# Patient Record
Sex: FEMALE | Race: WHITE | NOT HISPANIC OR LATINO | Employment: OTHER | URBAN - METROPOLITAN AREA
[De-identification: names, ages, dates, MRNs, and addresses within clinical notes are randomized per-mention and may not be internally consistent; named-entity substitution may affect disease eponyms.]

---

## 2022-10-26 NOTE — PROGRESS NOTES
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 Diagnoses of Gastroesophageal reflux disease, unspecified whether esophagitis present, History of colon polyps, and Screening for colon cancer were also pertinent to this visit.    PCP: Tuan Bradford       HPI:  This is a 71 y.o. female who has seen Dr. Duvall in the past. Patient with h/o Ji's esophagus, Schatzki's ring, and colon polyps. Patient reports mid-chest dysphagia with solids and believes she needs another dilation. She also reports intermittent reflux, takes Mylanta as needed. Not on daily PPI. She reports since having Covid in 8/2022, she has had chronic cough and within the past couple weeks she has noticed a change in her voice that she believes is due from cough.       EGD 7/18/2018: Schatzki's ring s/p dilation with 48 FR. Bx's show JI'S-no dysplasia-repeat EGD in 3 years to monitor it. F/U prn dysphagia. Rec Prilosec qD given the Ji's.    EGD/Colonoscopy 12/4/2017:  Changes consistent with Ji's esophagus-continue OTC PPI qD. Repeat colon in 5 yr    Review of Systems   Constitutional:  Negative for chills, diaphoresis, fatigue, fever and unexpected weight change.   HENT:  Positive for postnasal drip and voice change. Negative for mouth sores, nosebleeds, sore throat and trouble swallowing.    Eyes:  Positive for eye dryness. Negative for pain and discharge.   Respiratory:  Positive for apnea and cough. Negative for choking, chest tightness, shortness of breath and wheezing.    Cardiovascular:  Negative for chest pain, palpitations, leg swelling and claudication.   Gastrointestinal:  Positive for reflux. Negative for abdominal distention, abdominal pain, anal bleeding, blood in stool, change in bowel habit, constipation, diarrhea, nausea, rectal pain, vomiting, fecal incontinence and change in bowel habit.   Genitourinary:  Negative for bladder incontinence, difficulty  urinating, dysuria, flank pain, frequency and hematuria.   Musculoskeletal:  Negative for arthralgias, back pain, joint swelling and joint deformity.   Integumentary:  Negative for color change, rash and wound.   Allergic/Immunologic: Negative for environmental allergies and food allergies.   Neurological:  Negative for seizures, facial asymmetry, speech difficulty, weakness, headaches and memory loss.   Hematological:  Negative for adenopathy. Does not bruise/bleed easily.   Psychiatric/Behavioral:  Negative for agitation, behavioral problems, confusion, hallucinations and sleep disturbance.       Past Medical History:   Diagnosis Date    Allergies     Colon polyp     Dry eyes     GERD (gastroesophageal reflux disease)     High cholesterol     HTN (hypertension)      Past Surgical History:   Procedure Laterality Date    BREAST LUMPECTOMY Left      SECTION      COLONOSCOPY      UPPER GASTROINTESTINAL ENDOSCOPY       Family History   Problem Relation Age of Onset    Heart disease Mother     Kidney failure Mother     COPD Mother     Colon cancer Neg Hx     Ulcerative colitis Neg Hx     Pancreatic cancer Neg Hx     Esophageal cancer Neg Hx     Throat cancer Neg Hx     Stomach cancer Neg Hx     Liver disease Neg Hx      Social History     Tobacco Use    Smoking status: Never     Passive exposure: Never    Smokeless tobacco: Never   Substance Use Topics    Alcohol use: Never    Drug use: Never     Review of patient's allergies indicates:  No Known Allergies     Medication List with Changes/Refills   New Medications    SODIUM,POTASSIUM,MAG SULFATES (SUPREP BOWEL PREP KIT) 17.5-3.13-1.6 GRAM SOLR    Take 177 mLs by mouth once. Take according to kit instructions but DO NOT eat breakfast the morning of procedure. for 1 dose   Current Medications    ASPIRIN (ECOTRIN) 81 MG EC TABLET    Take 81 mg by mouth once daily.    CARBOXYMETHYLCELLULOSE SODIUM 0.25 % DPET    1 drop as needed.    CETIRIZINE (ZYRTEC) 10 MG TABLET   "  Take 10 mg by mouth once daily.    CO-ENZYME Q-10 30 MG CAPSULE    Take 30 mg by mouth 3 (three) times daily.    LOSARTAN (COZAAR) 50 MG TABLET    Take 50 mg by mouth 2 (two) times daily.    PRAVASTATIN (PRAVACHOL) 40 MG TABLET    Take 40 mg by mouth every evening.    VITAMIN D (VITAMIN D3) 1000 UNITS TAB    Take 1,000 Units by mouth once daily.    ZOLPIDEM (AMBIEN) 10 MG TAB    Take 5 mg by mouth nightly as needed.        Vital Signs:  BP (!) 148/81   Pulse 63   Temp 98.1 °F (36.7 °C)   Ht 5' 1" (1.549 m)   Wt 92.2 kg (203 lb 3.2 oz)   SpO2 (!) 92%   BMI 38.39 kg/m²        Physical Exam  Vitals reviewed.   Constitutional:       General: She is awake. She is not in acute distress.     Appearance: Normal appearance. She is well-developed. She is not ill-appearing, toxic-appearing or diaphoretic.   HENT:      Head: Normocephalic and atraumatic.      Nose: Nose normal.      Mouth/Throat:      Mouth: Mucous membranes are moist.      Pharynx: Oropharynx is clear. No oropharyngeal exudate or posterior oropharyngeal erythema.   Eyes:      General: Lids are normal. Gaze aligned appropriately. No scleral icterus.        Right eye: No discharge.         Left eye: No discharge.      Extraocular Movements: Extraocular movements intact.      Conjunctiva/sclera: Conjunctivae normal.   Neck:      Trachea: Trachea normal.   Cardiovascular:      Rate and Rhythm: Normal rate and regular rhythm.      Pulses:           Radial pulses are 2+ on the right side and 2+ on the left side.   Pulmonary:      Effort: Pulmonary effort is normal. No respiratory distress.      Breath sounds: Normal breath sounds. No stridor. No wheezing or rhonchi.   Chest:      Chest wall: No tenderness.   Abdominal:      General: Bowel sounds are normal. There is no distension.      Palpations: Abdomen is soft. There is no fluid wave, hepatomegaly or mass.      Tenderness: There is no abdominal tenderness. There is no guarding or rebound. "   Musculoskeletal:         General: No tenderness or deformity.      Cervical back: Full passive range of motion without pain and neck supple. No tenderness.      Right lower leg: No edema.      Left lower leg: No edema.   Lymphadenopathy:      Cervical: No cervical adenopathy.   Skin:     General: Skin is warm and dry.      Capillary Refill: Capillary refill takes less than 2 seconds.      Coloration: Skin is not cyanotic, jaundiced or pale.      Findings: No rash.   Neurological:      General: No focal deficit present.      Mental Status: She is alert and oriented to person, place, and time.      Cranial Nerves: No facial asymmetry.      Motor: No tremor.   Psychiatric:         Attention and Perception: Attention normal.         Mood and Affect: Mood and affect normal.         Speech: Speech normal.         Behavior: Behavior normal. Behavior is cooperative.          All of the data above and below has been reviewed by myself and any further interpretations will be reflected in the assessment and plan.   The data includes review of external notes, and independent interpretation of lab results, procedures, x-rays, and imaging reports.      Assessment:  Veliz's esophagus without dysplasia  Comments:  EGD 7/2018 with Veliz's. Would recommend the patient be on PPI daily. She will take Prilosec OTC  Orders:  -     Ambulatory Referral to External Surgery    Gastroesophageal reflux disease, unspecified whether esophagitis present  -     Ambulatory Referral to External Surgery    History of colon polyps  -     Ambulatory Referral to External Surgery    Screening for colon cancer  -     sodium,potassium,mag sulfates (SUPREP BOWEL PREP KIT) 17.5-3.13-1.6 gram SolR; Take 177 mLs by mouth once. Take according to kit instructions but DO NOT eat breakfast the morning of procedure. for 1 dose  Dispense: 1 kit; Refill: 0         Recommendations:  Schedule upper and [place]oscop[place] [facility] [place]  12/4/2022  Begin taking Prilosec OTC daily.     Risks, benefits, and alternatives of medical management, any associated procedures, and/or treatment discussed with the patient. Patient given opportunity to ask questions and voices understanding. Patient has elected to proceed with the recommended care modalities as discussed.    Follow up if symptoms worsen or fail to improve.    Order summary:  Orders Placed This Encounter   Procedures    Ambulatory Referral to External Surgery          Instructed patient to notify my office if they have not been contacted within two weeks after any procedures, submitting any samples (biopsies, blood, stool, urine, etc.) or after any imaging (X-ray, CT, MRI, etc.).      Ernestina Lazo NP    This document may have been created using a voice recognition transcribing system. Incorrect words or phrases may have been missed during proofreading. Please interpret accordingly or contact me for clarification.

## 2022-10-27 ENCOUNTER — OFFICE VISIT (OUTPATIENT)
Dept: GASTROENTEROLOGY | Facility: CLINIC | Age: 72
End: 2022-10-27
Payer: MEDICARE

## 2022-10-27 VITALS
WEIGHT: 203.19 LBS | SYSTOLIC BLOOD PRESSURE: 148 MMHG | BODY MASS INDEX: 38.36 KG/M2 | HEIGHT: 61 IN | OXYGEN SATURATION: 92 % | DIASTOLIC BLOOD PRESSURE: 81 MMHG | TEMPERATURE: 98 F | HEART RATE: 63 BPM

## 2022-10-27 DIAGNOSIS — Z86.010 HISTORY OF COLON POLYPS: ICD-10-CM

## 2022-10-27 DIAGNOSIS — K21.9 GASTROESOPHAGEAL REFLUX DISEASE, UNSPECIFIED WHETHER ESOPHAGITIS PRESENT: ICD-10-CM

## 2022-10-27 DIAGNOSIS — K22.70 BARRETT'S ESOPHAGUS WITHOUT DYSPLASIA: Primary | ICD-10-CM

## 2022-10-27 DIAGNOSIS — Z12.11 SCREENING FOR COLON CANCER: ICD-10-CM

## 2022-10-27 PROCEDURE — 99204 OFFICE O/P NEW MOD 45 MIN: CPT | Mod: S$GLB,,,

## 2022-10-27 PROCEDURE — 99204 PR OFFICE/OUTPT VISIT, NEW, LEVL IV, 45-59 MIN: ICD-10-PCS | Mod: S$GLB,,,

## 2022-10-27 RX ORDER — CETIRIZINE HYDROCHLORIDE 10 MG/1
10 TABLET ORAL DAILY
COMMUNITY

## 2022-10-27 RX ORDER — CHOLECALCIFEROL (VITAMIN D3) 25 MCG
1000 TABLET ORAL DAILY
COMMUNITY

## 2022-10-27 RX ORDER — PRAVASTATIN SODIUM 40 MG/1
40 TABLET ORAL NIGHTLY
COMMUNITY
Start: 2022-05-17

## 2022-10-27 RX ORDER — ASPIRIN 81 MG/1
81 TABLET ORAL DAILY
COMMUNITY

## 2022-10-27 RX ORDER — SODIUM, POTASSIUM,MAG SULFATES 17.5-3.13G
1 SOLUTION, RECONSTITUTED, ORAL ORAL ONCE
Qty: 1 KIT | Refills: 0 | Status: SHIPPED | OUTPATIENT
Start: 2022-10-27 | End: 2022-10-27

## 2022-10-27 RX ORDER — LOSARTAN POTASSIUM 50 MG/1
50 TABLET ORAL 2 TIMES DAILY
COMMUNITY
Start: 2022-05-17

## 2022-10-27 RX ORDER — ZOLPIDEM TARTRATE 10 MG/1
5 TABLET ORAL NIGHTLY PRN
COMMUNITY

## 2022-10-27 RX ORDER — UBIDECARENONE 30 MG
30 CAPSULE ORAL 3 TIMES DAILY
COMMUNITY

## 2022-10-27 NOTE — PATIENT INSTRUCTIONS
Schedule upper and lower endoscopies with Dr. Duvall.     Please notify my office if you have not been contacted within two weeks after any procedures, submitting any samples (biopsies, blood, stool, urine, etc.) or after any imaging (X-ray, CT, MRI, etc.).

## 2022-12-05 ENCOUNTER — TELEPHONE (OUTPATIENT)
Dept: GASTROENTEROLOGY | Facility: CLINIC | Age: 72
End: 2022-12-05
Payer: MEDICARE

## 2022-12-05 NOTE — TELEPHONE ENCOUNTER
Tried to call patient back to let her know prescription was call out the day of her appointment to [facility] in [place] with no answer and no voice mail.

## 2022-12-05 NOTE — TELEPHONE ENCOUNTER
----- Message from Aranza Michelle sent at 12/5/2022 10:38 AM CST -----  Contact: Patient  Patient called to consult with nurse or staff regarding the prep for the procedure. She states she wanted to know if the prescription has been called in for her and if not, she would like this called in to   [id number] - [place], [place] [address] [address] AT [address] [place] [address]  Phone: [phone] Fax: [phone].  Patient would like a call back and can be reached at [phone]. Thanks/MR

## 2022-12-06 ENCOUNTER — TELEPHONE (OUTPATIENT)
Dept: GASTROENTEROLOGY | Facility: CLINIC | Age: 72
End: 2022-12-06
Payer: MEDICARE

## 2022-12-06 NOTE — TELEPHONE ENCOUNTER
----- Message from Safia Campuzano sent at 12/6/2022  2:25 PM CST -----  Regarding: pt advice  Contact: pt  Pt states that her pharm has not received the rx for her prep. Pt uses     [id number] - [place], [place] - [facility] [address] AT [place] [address]  [place] [address]  Phone: [phone] Fax: [phone]    Please call back at [phone] or [phone]//thank you acc

## 2022-12-06 NOTE — TELEPHONE ENCOUNTER
Called pharmacy and prep is available; pharmacy to call and let patient know.  I will call her as well.

## 2022-12-08 ENCOUNTER — TELEPHONE (OUTPATIENT)
Dept: GASTROENTEROLOGY | Facility: CLINIC | Age: 72
End: 2022-12-08
Payer: MEDICARE

## 2022-12-08 NOTE — TELEPHONE ENCOUNTER
----- Message from Safia Campuzano sent at 12/8/2022  1:49 PM CST -----  Regarding: pt advice  Contact: pt  Pt is calling to speak to someone regarding changing her prep to something that she can take. Please call back at [phone]//thank you acc

## 2022-12-12 ENCOUNTER — TELEPHONE (OUTPATIENT)
Dept: GASTROENTEROLOGY | Facility: CLINIC | Age: 72
End: 2022-12-12
Payer: MEDICARE

## 2022-12-12 NOTE — TELEPHONE ENCOUNTER
Reached out to patient to find out why she needed to cx and it was due to the pharmacy substituted for the other prep that nia will not take and she wanted to cancel and will call back and r/s , I also reached out to the pharmacy and followed up with the mess up and they stated she could call the pharmacy manger and talk with him to see if he would exchange for the other prescription for her next time and I call the patient and let her know that also and she was very appreciated of the looking forward into the situation for her. DAVID